# Patient Record
Sex: MALE | Race: WHITE | NOT HISPANIC OR LATINO | Employment: OTHER | URBAN - METROPOLITAN AREA
[De-identification: names, ages, dates, MRNs, and addresses within clinical notes are randomized per-mention and may not be internally consistent; named-entity substitution may affect disease eponyms.]

---

## 2018-06-11 ENCOUNTER — HOSPITAL ENCOUNTER (OUTPATIENT)
Facility: HOSPITAL | Age: 48
Discharge: HOME | End: 2018-06-13
Attending: EMERGENCY MEDICINE | Admitting: INTERNAL MEDICINE
Payer: MEDICAID

## 2018-06-11 DIAGNOSIS — I10 ESSENTIAL HYPERTENSION: ICD-10-CM

## 2018-06-11 DIAGNOSIS — K92.2 UPPER GI BLEED: Primary | ICD-10-CM

## 2018-06-11 DIAGNOSIS — I50.9 CHF (CONGESTIVE HEART FAILURE): ICD-10-CM

## 2018-06-11 DIAGNOSIS — K29.80 DUODENITIS: ICD-10-CM

## 2018-06-11 DIAGNOSIS — K92.0 HEMATEMESIS WITH NAUSEA: ICD-10-CM

## 2018-06-11 DIAGNOSIS — K29.60 NSAID INDUCED GASTRITIS: ICD-10-CM

## 2018-06-11 DIAGNOSIS — T39.395A NSAID INDUCED GASTRITIS: ICD-10-CM

## 2018-06-11 DIAGNOSIS — I50.9 CONGESTIVE HEART FAILURE, UNSPECIFIED HF CHRONICITY, UNSPECIFIED HEART FAILURE TYPE: ICD-10-CM

## 2018-06-11 DIAGNOSIS — I25.10 CORONARY ARTERY DISEASE, ANGINA PRESENCE UNSPECIFIED, UNSPECIFIED VESSEL OR LESION TYPE, UNSPECIFIED WHETHER NATIVE OR TRANSPLANTED HEART: ICD-10-CM

## 2018-06-11 DIAGNOSIS — Z95.0 CARDIAC PACEMAKER IN SITU: ICD-10-CM

## 2018-06-11 DIAGNOSIS — R10.13 EPIGASTRIC PAIN: ICD-10-CM

## 2018-06-11 DIAGNOSIS — K27.9 PEPTIC ULCER DISEASE: ICD-10-CM

## 2018-06-11 LAB
ALBUMIN SERPL BCP-MCNC: 3.4 G/DL
ALP SERPL-CCNC: 69 U/L
ALT SERPL W/O P-5'-P-CCNC: 83 U/L
ANION GAP SERPL CALC-SCNC: 12 MMOL/L
AST SERPL-CCNC: 42 U/L
BASOPHILS # BLD AUTO: 0.2 K/UL
BASOPHILS NFR BLD: 1.3 %
BILIRUB SERPL-MCNC: 1.6 MG/DL
BUN SERPL-MCNC: 19 MG/DL
CALCIUM SERPL-MCNC: 9.2 MG/DL
CHLORIDE SERPL-SCNC: 98 MMOL/L
CO2 SERPL-SCNC: 25 MMOL/L
CREAT SERPL-MCNC: 1.3 MG/DL
DIFFERENTIAL METHOD: ABNORMAL
EOSINOPHIL # BLD AUTO: 0.1 K/UL
EOSINOPHIL NFR BLD: 0.8 %
ERYTHROCYTE [DISTWIDTH] IN BLOOD BY AUTOMATED COUNT: 16.9 %
EST. GFR  (AFRICAN AMERICAN): >60 ML/MIN/1.73 M^2
EST. GFR  (NON AFRICAN AMERICAN): >60 ML/MIN/1.73 M^2
GIANT PLATELETS BLD QL SMEAR: PRESENT
GLUCOSE SERPL-MCNC: 107 MG/DL
HCT VFR BLD AUTO: 43.7 %
HGB BLD-MCNC: 14.3 G/DL
LIPASE SERPL-CCNC: 12 U/L
LYMPHOCYTES # BLD AUTO: 1.5 K/UL
LYMPHOCYTES NFR BLD: 12.6 %
MCH RBC QN AUTO: 31.3 PG
MCHC RBC AUTO-ENTMCNC: 32.6 G/DL
MCV RBC AUTO: 96 FL
MONOCYTES # BLD AUTO: 0.9 K/UL
MONOCYTES NFR BLD: 7.4 %
NEUTROPHILS # BLD AUTO: 9.6 K/UL
NEUTROPHILS NFR BLD: 77.9 %
PLATELET # BLD AUTO: 195 K/UL
PLATELET BLD QL SMEAR: ABNORMAL
PMV BLD AUTO: 10.3 FL
POTASSIUM SERPL-SCNC: 3.7 MMOL/L
PROT SERPL-MCNC: 6.9 G/DL
RBC # BLD AUTO: 4.56 M/UL
SODIUM SERPL-SCNC: 135 MMOL/L
WBC # BLD AUTO: 12.3 K/UL

## 2018-06-11 PROCEDURE — C9113 INJ PANTOPRAZOLE SODIUM, VIA: HCPCS | Performed by: INTERNAL MEDICINE

## 2018-06-11 PROCEDURE — 63600175 PHARM REV CODE 636 W HCPCS: Performed by: INTERNAL MEDICINE

## 2018-06-11 PROCEDURE — 25000003 PHARM REV CODE 250: Performed by: EMERGENCY MEDICINE

## 2018-06-11 PROCEDURE — 93010 ELECTROCARDIOGRAM REPORT: CPT | Mod: ,,, | Performed by: INTERNAL MEDICINE

## 2018-06-11 PROCEDURE — 83880 ASSAY OF NATRIURETIC PEPTIDE: CPT

## 2018-06-11 PROCEDURE — 99220 PR INITIAL OBSERVATION CARE,LEVL III: CPT | Mod: ,,, | Performed by: INTERNAL MEDICINE

## 2018-06-11 PROCEDURE — 99284 EMERGENCY DEPT VISIT MOD MDM: CPT | Mod: 25

## 2018-06-11 PROCEDURE — 83690 ASSAY OF LIPASE: CPT

## 2018-06-11 PROCEDURE — 94760 N-INVAS EAR/PLS OXIMETRY 1: CPT

## 2018-06-11 PROCEDURE — 96365 THER/PROPH/DIAG IV INF INIT: CPT

## 2018-06-11 PROCEDURE — 36415 COLL VENOUS BLD VENIPUNCTURE: CPT

## 2018-06-11 PROCEDURE — 96376 TX/PRO/DX INJ SAME DRUG ADON: CPT

## 2018-06-11 PROCEDURE — 63600175 PHARM REV CODE 636 W HCPCS: Performed by: EMERGENCY MEDICINE

## 2018-06-11 PROCEDURE — 25000003 PHARM REV CODE 250: Performed by: INTERNAL MEDICINE

## 2018-06-11 PROCEDURE — 80053 COMPREHEN METABOLIC PANEL: CPT

## 2018-06-11 PROCEDURE — C9113 INJ PANTOPRAZOLE SODIUM, VIA: HCPCS | Performed by: EMERGENCY MEDICINE

## 2018-06-11 PROCEDURE — 85018 HEMOGLOBIN: CPT

## 2018-06-11 PROCEDURE — 96366 THER/PROPH/DIAG IV INF ADDON: CPT

## 2018-06-11 PROCEDURE — 96375 TX/PRO/DX INJ NEW DRUG ADDON: CPT

## 2018-06-11 PROCEDURE — 85014 HEMATOCRIT: CPT

## 2018-06-11 PROCEDURE — 84484 ASSAY OF TROPONIN QUANT: CPT

## 2018-06-11 PROCEDURE — 85025 COMPLETE CBC W/AUTO DIFF WBC: CPT

## 2018-06-11 PROCEDURE — 93005 ELECTROCARDIOGRAM TRACING: CPT

## 2018-06-11 PROCEDURE — 96375 TX/PRO/DX INJ NEW DRUG ADDON: CPT | Mod: 59

## 2018-06-11 PROCEDURE — 96374 THER/PROPH/DIAG INJ IV PUSH: CPT

## 2018-06-11 PROCEDURE — G0378 HOSPITAL OBSERVATION PER HR: HCPCS

## 2018-06-11 PROCEDURE — 25000003 PHARM REV CODE 250: Performed by: NURSE PRACTITIONER

## 2018-06-11 RX ORDER — ACETAMINOPHEN 325 MG/1
650 TABLET ORAL EVERY 6 HOURS PRN
Status: DISCONTINUED | OUTPATIENT
Start: 2018-06-11 | End: 2018-06-13 | Stop reason: HOSPADM

## 2018-06-11 RX ORDER — MORPHINE SULFATE 8 MG/ML
8 INJECTION INTRAMUSCULAR; INTRAVENOUS; SUBCUTANEOUS
Status: COMPLETED | OUTPATIENT
Start: 2018-06-11 | End: 2018-06-11

## 2018-06-11 RX ORDER — RAMELTEON 8 MG/1
8 TABLET ORAL NIGHTLY PRN
Status: DISCONTINUED | OUTPATIENT
Start: 2018-06-11 | End: 2018-06-13 | Stop reason: HOSPADM

## 2018-06-11 RX ORDER — ONDANSETRON 4 MG/1
4 TABLET, ORALLY DISINTEGRATING ORAL
Status: COMPLETED | OUTPATIENT
Start: 2018-06-11 | End: 2018-06-11

## 2018-06-11 RX ORDER — MORPHINE SULFATE 2 MG/ML
2 INJECTION, SOLUTION INTRAMUSCULAR; INTRAVENOUS EVERY 4 HOURS PRN
Status: DISCONTINUED | OUTPATIENT
Start: 2018-06-11 | End: 2018-06-13 | Stop reason: HOSPADM

## 2018-06-11 RX ORDER — AMOXICILLIN 250 MG
1 CAPSULE ORAL 2 TIMES DAILY PRN
Status: DISCONTINUED | OUTPATIENT
Start: 2018-06-11 | End: 2018-06-13 | Stop reason: HOSPADM

## 2018-06-11 RX ORDER — FUROSEMIDE 40 MG/1
40 TABLET ORAL 2 TIMES DAILY
COMMUNITY

## 2018-06-11 RX ORDER — PANTOPRAZOLE SODIUM 40 MG/1
40 TABLET, DELAYED RELEASE ORAL DAILY
Status: DISCONTINUED | OUTPATIENT
Start: 2018-06-12 | End: 2018-06-11

## 2018-06-11 RX ORDER — MORPHINE SULFATE ORAL SOLUTION 10 MG/5ML
15 SOLUTION ORAL EVERY 4 HOURS PRN
Status: DISCONTINUED | OUTPATIENT
Start: 2018-06-11 | End: 2018-06-13 | Stop reason: HOSPADM

## 2018-06-11 RX ORDER — ONDANSETRON 4 MG/1
8 TABLET, ORALLY DISINTEGRATING ORAL EVERY 8 HOURS PRN
Status: DISCONTINUED | OUTPATIENT
Start: 2018-06-11 | End: 2018-06-13 | Stop reason: HOSPADM

## 2018-06-11 RX ORDER — IPRATROPIUM BROMIDE AND ALBUTEROL SULFATE 2.5; .5 MG/3ML; MG/3ML
3 SOLUTION RESPIRATORY (INHALATION) EVERY 4 HOURS PRN
Status: DISCONTINUED | OUTPATIENT
Start: 2018-06-11 | End: 2018-06-13 | Stop reason: HOSPADM

## 2018-06-11 RX ORDER — MAG HYDROX/ALUMINUM HYD/SIMETH 200-200-20
30 SUSPENSION, ORAL (FINAL DOSE FORM) ORAL
Status: COMPLETED | OUTPATIENT
Start: 2018-06-11 | End: 2018-06-11

## 2018-06-11 RX ORDER — PANTOPRAZOLE SODIUM 40 MG/10ML
40 INJECTION, POWDER, LYOPHILIZED, FOR SOLUTION INTRAVENOUS
Status: COMPLETED | OUTPATIENT
Start: 2018-06-11 | End: 2018-06-11

## 2018-06-11 RX ORDER — MORPHINE SULFATE 15 MG/1
15 TABLET ORAL EVERY 4 HOURS PRN
Status: DISCONTINUED | OUTPATIENT
Start: 2018-06-11 | End: 2018-06-11 | Stop reason: RX

## 2018-06-11 RX ORDER — SODIUM CHLORIDE 0.9 % (FLUSH) 0.9 %
5 SYRINGE (ML) INJECTION
Status: DISCONTINUED | OUTPATIENT
Start: 2018-06-11 | End: 2018-06-13 | Stop reason: HOSPADM

## 2018-06-11 RX ORDER — SODIUM CHLORIDE 9 MG/ML
INJECTION, SOLUTION INTRAVENOUS CONTINUOUS
Status: ACTIVE | OUTPATIENT
Start: 2018-06-12 | End: 2018-06-12

## 2018-06-11 RX ADMIN — DEXTROSE 8 MG/HR: 50 INJECTION, SOLUTION INTRAVENOUS at 08:06

## 2018-06-11 RX ADMIN — MORPHINE SULFATE 8 MG: 8 INJECTION INTRAVENOUS at 03:06

## 2018-06-11 RX ADMIN — DEXTROSE 8 MG/HR: 50 INJECTION, SOLUTION INTRAVENOUS at 11:06

## 2018-06-11 RX ADMIN — Medication 2 MG: at 11:06

## 2018-06-11 RX ADMIN — SODIUM CHLORIDE: 0.9 INJECTION, SOLUTION INTRAVENOUS at 11:06

## 2018-06-11 RX ADMIN — PANTOPRAZOLE SODIUM 40 MG: 40 INJECTION, POWDER, FOR SOLUTION INTRAVENOUS at 05:06

## 2018-06-11 RX ADMIN — MORPHINE SULFATE 15 MG: 10 SOLUTION ORAL at 08:06

## 2018-06-11 RX ADMIN — SACUBITRIL AND VALSARTAN 1 TABLET: 24; 26 TABLET, FILM COATED ORAL at 11:06

## 2018-06-11 RX ADMIN — LIDOCAINE HYDROCHLORIDE: 20 SOLUTION ORAL; TOPICAL at 11:06

## 2018-06-11 RX ADMIN — ONDANSETRON 4 MG: 4 TABLET, ORALLY DISINTEGRATING ORAL at 03:06

## 2018-06-11 RX ADMIN — Medication 2 MG: at 06:06

## 2018-06-11 RX ADMIN — ALUMINUM HYDROXIDE, MAGNESIUM HYDROXIDE, AND SIMETHICONE 30 ML: 200; 200; 20 SUSPENSION ORAL at 03:06

## 2018-06-11 NOTE — ED PROVIDER NOTES
Encounter Date: 6/11/2018    SCRIBE #1 NOTE: I, Christian Alegre, am scribing for, and in the presence of, Dr. Velasquez .       History     Chief Complaint   Patient presents with    Abdominal Pain    Hematemesis     Time seen by provider: 3:37 PM on 06/11/2018    Chief Complaint: abdominal pain     Chinedu Pineda is a 47 y.o. male with a PMHx of CAD and MI who presents to the ED for further evaluation of abdominal pain with an onset of 5 days. Patient repots that he awoke with this abdominal pain that is in the epigastric region 5 days ago. He states that he started vomiting as well during this time. Patient reports that he went to [facility] ER who prescribed the patient Protonix which is offering the patient little relief. He states that this morning he started vomiting again, reporting that towards the end of his vomiting he started to have some bright red blood in his vomit. Patient reports that he has felt like his abdomen has been more distended since this am as well. Patient states that he his lower extremity edema has worsened since this am as well. He endorses some dizziness upon standing as well. He denies any history of liver disease and drinking. Patient has a PSHx of pacer defibrillator, cholecystectomy, and appendectomy. Patient denies chest pain, SOB, fever, nausea, vomiting, diarrhea, cough, and headache.       The history is provided by the patient.     Review of patient's allergies indicates:  No Known Allergies  Past Medical History:   Diagnosis Date    Coronary artery disease     MI (myocardial infarction)      Past Surgical History:   Procedure Laterality Date    APPENDECTOMY      CHOLECYSTECTOMY      pacer defibrillator       History reviewed. No pertinent family history.  Social History   Substance Use Topics    Smoking status: Former Smoker    Smokeless tobacco: Not on file    Alcohol use Not on file     Review of Systems   Constitutional: Negative for fever.   HENT: Negative for  congestion.    Eyes: Negative for visual disturbance.   Respiratory: Negative for shortness of breath.    Cardiovascular: Negative for chest pain.   Gastrointestinal: Positive for abdominal distention, abdominal pain, nausea and vomiting. Negative for diarrhea.   Genitourinary: Negative for difficulty urinating.   Musculoskeletal: Negative for back pain and neck pain.   Skin: Negative for color change.   Neurological: Negative for headaches.   All other systems reviewed and are negative.      Physical Exam     Initial Vitals [06/11/18 1524]   BP Pulse Resp Temp SpO2   122/86 100 20 98.2 °F (36.8 °C) 100 %      MAP       --         Physical Exam    Nursing note and vitals reviewed.  Constitutional: He appears well-developed and well-nourished. He is not diaphoretic. No distress.   HENT:   Head: Normocephalic and atraumatic.   Mouth/Throat: Oropharynx is clear and moist.   Eyes: Conjunctivae are normal.   Neck: Neck supple.   Cardiovascular: Normal rate, regular rhythm, normal heart sounds and intact distal pulses. Exam reveals no gallop and no friction rub.    No murmur heard.  Pulses:       Dorsalis pedis pulses are 2+ on the right side, and 2+ on the left side.   Pulmonary/Chest: Breath sounds normal. He has no wheezes. He has no rhonchi. He has no rales.   Abdominal: Soft. He exhibits no distension. There is tenderness (voluntary guarding ) in the epigastric area. There is guarding. There is no rebound and negative Mitchell's sign.   Musculoskeletal: Normal range of motion.   Neurological: He is alert and oriented to person, place, and time.   Skin: No rash noted. No erythema.         ED Course   Procedures  Labs Reviewed   CBC W/ AUTO DIFFERENTIAL - Abnormal; Notable for the following:        Result Value    RBC 4.56 (*)     MCH 31.3 (*)     RDW 16.9 (*)     Gran # (ANC) 9.6 (*)     Gran% 77.9 (*)     Lymph% 12.6 (*)     Platelet Estimate Decreased (*)     All other components within normal limits   COMPREHENSIVE  METABOLIC PANEL - Abnormal; Notable for the following:     Sodium 135 (*)     Albumin 3.4 (*)     Total Bilirubin 1.6 (*)     AST 42 (*)     ALT 83 (*)     All other components within normal limits   LIPASE          X-Ray Chest 1 View   Final Result      Findings suggesting mild CHF         Electronically signed by: Ashley Simon MD   Date:    06/11/2018   Time:    16:07           Medical Decision Making:   History:   Old Medical Records: I decided to obtain old medical records.  Clinical Tests:   Lab Tests: Ordered and Reviewed  Medical Tests: Ordered and Reviewed            Scribe Attestation:   Scribe #1: I performed the above scribed service and the documentation accurately describes the services I performed. I attest to the accuracy of the note.    I, Dr. Jaiden Velasquez, personally performed the services described in this documentation. All medical record entries made by the scribe were at my direction and in my presence.  I have reviewed the chart and agree that the record reflects my personal performance and is accurate and complete. Jaiden Velasquez MD.  5:09 PM 06/11/2018    Chinedu Pineda is a 47 y.o. male presenting with epigastric pain in the setting of more recent hematemesis.  He reports negative CT scan for same pain several days ago at outside hospital.  Low suspicion for emergent intra-abdominal process requiring surgical intervention and I do not think repeat CT is indicated here.  I doubt perforated viscus, obstruction, atypical appendicitis, abscess.  Laboratories reviewed with mild transaminitis and elevated bilirubin to be trended.  Patient denies history of significant liver disease known in the past.  No further emesis here.  Hemoglobin is initially normal. Protonix IV given.  In view of his multiple comorbidities, I will admit for observation, serial hemoglobin assessment, and GI consultation at hospitalist service discretion.  I do not think emergent endoscopy from the emergency  department is indicated.  There is no indication for transfusion at this point.        ED Course as of Jun 11 1709   Mon Jun 11, 2018   1623 EKG:  NSR, rate of 93, wide QRS with LBBB, L axis.  Appropriate discordance.  No sign of acute ischemia.   [MR]      ED Course User Index  [MR] Jaiden Velasquez MD     Clinical Impression:   The primary encounter diagnosis was Upper GI bleed. A diagnosis of Epigastric pain was also pertinent to this visit.      Disposition:   Disposition: Admitted                        Jaiden Velasquez MD  06/11/18 5987

## 2018-06-11 NOTE — NURSING
Received pt's from ED, alert x 4. NAD noted. C/o abdominal pain, treated as order. Admission packet reviewed with them, verbalized understanding.

## 2018-06-11 NOTE — ED NOTES
Pt awake alert in no acute distress, pt reports generalized abd pain for a month, with emesis that started yesterday, denies chest pain or sob, family at bedside,

## 2018-06-12 ENCOUNTER — SURGERY (OUTPATIENT)
Age: 48
End: 2018-06-12

## 2018-06-12 ENCOUNTER — ANESTHESIA (OUTPATIENT)
Dept: ENDOSCOPY | Facility: HOSPITAL | Age: 48
End: 2018-06-12
Payer: MEDICAID

## 2018-06-12 ENCOUNTER — ANESTHESIA EVENT (OUTPATIENT)
Dept: ENDOSCOPY | Facility: HOSPITAL | Age: 48
End: 2018-06-12
Payer: MEDICAID

## 2018-06-12 PROBLEM — R50.9 FEVER: Status: ACTIVE | Noted: 2018-06-12

## 2018-06-12 PROBLEM — R11.2 NAUSEA & VOMITING: Status: ACTIVE | Noted: 2018-06-12

## 2018-06-12 PROBLEM — Z87.891 HISTORY OF SMOKING GREATER THAN 50 PACK YEARS: Status: ACTIVE | Noted: 2018-06-12

## 2018-06-12 PROBLEM — F10.10 ALCOHOL CONSUMPTION BINGE DRINKING: Status: ACTIVE | Noted: 2018-06-12

## 2018-06-12 PROBLEM — R74.01 TRANSAMINITIS: Status: ACTIVE | Noted: 2018-06-12

## 2018-06-12 PROBLEM — T39.395A NSAID INDUCED GASTRITIS: Status: ACTIVE | Noted: 2018-06-12

## 2018-06-12 PROBLEM — I50.9 CHF (CONGESTIVE HEART FAILURE): Status: ACTIVE | Noted: 2018-06-12

## 2018-06-12 PROBLEM — K29.60 NSAID INDUCED GASTRITIS: Status: ACTIVE | Noted: 2018-06-12

## 2018-06-12 LAB
ALBUMIN SERPL BCP-MCNC: 2.9 G/DL
ALP SERPL-CCNC: 57 U/L
ALT SERPL W/O P-5'-P-CCNC: 61 U/L
ANION GAP SERPL CALC-SCNC: 9 MMOL/L
AORTIC ROOT ANNULUS: 3.26 CM
AORTIC VALVE CUSP SEPERATION: 2.14 CM
AST SERPL-CCNC: 27 U/L
AV MEAN GRADIENT: 3.82 MMHG
AV PEAK GRADIENT: 6.44 MMHG
AV VALVE AREA: 2.44 CM2
BASOPHILS # BLD AUTO: 0.1 K/UL
BASOPHILS NFR BLD: 0.5 %
BILIRUB SERPL-MCNC: 1.4 MG/DL
BNP SERPL-MCNC: 1500 PG/ML
BSA FOR ECHO PROCEDURE: 2.15 M2
BUN SERPL-MCNC: 17 MG/DL
CALCIUM SERPL-MCNC: 8.6 MG/DL
CHLORIDE SERPL-SCNC: 97 MMOL/L
CHOLEST SERPL-MCNC: 82 MG/DL
CHOLEST/HDLC SERPL: 4.1 {RATIO}
CO2 SERPL-SCNC: 26 MMOL/L
CREAT SERPL-MCNC: 1.2 MG/DL
CV ECHO LV RWT: 0.26 CM
DIFFERENTIAL METHOD: ABNORMAL
DOP CALC AO PEAK VEL: 1.27 M/S
DOP CALC AO VTI: 17.76 CM
DOP CALC LVOT AREA: 3.2 CM2
DOP CALC LVOT DIAMETER: 2.02 CM
DOP CALC LVOT STROKE VOLUME: 43.4 CM3
DOP CALCLVOT PEAK VEL VTI: 13.55 CM
E WAVE DECELERATION TIME: 85.78 MSEC
E/A RATIO: 2.04
E/E' RATIO: 9.27
ECHO LV POSTERIOR WALL: 0.81 CM (ref 0.6–1.1)
EOSINOPHIL # BLD AUTO: 0.1 K/UL
EOSINOPHIL NFR BLD: 1 %
ERYTHROCYTE [DISTWIDTH] IN BLOOD BY AUTOMATED COUNT: 16.3 %
EST. GFR  (AFRICAN AMERICAN): >60 ML/MIN/1.73 M^2
EST. GFR  (NON AFRICAN AMERICAN): >60 ML/MIN/1.73 M^2
FRACTIONAL SHORTENING: 7 % (ref 28–44)
GLUCOSE SERPL-MCNC: 111 MG/DL
HCT VFR BLD AUTO: 39.4 %
HCT VFR BLD AUTO: 39.4 %
HCT VFR BLD AUTO: 40 %
HCT VFR BLD AUTO: 40.4 %
HCT VFR BLD AUTO: 40.6 %
HDLC SERPL-MCNC: 20 MG/DL
HDLC SERPL: 24.4 %
HGB BLD-MCNC: 12.8 G/DL
HGB BLD-MCNC: 12.8 G/DL
HGB BLD-MCNC: 13.2 G/DL
HGB BLD-MCNC: 13.3 G/DL
HGB BLD-MCNC: 13.3 G/DL
INTERVENTRICULAR SEPTUM: 0.92 CM (ref 0.6–1.1)
IVRT: 0.05 MSEC
LDLC SERPL CALC-MCNC: 50 MG/DL
LEFT ATRIUM SIZE: 4.08 CM
LEFT INTERNAL DIMENSION IN SYSTOLE: 6.15 CM (ref 2.1–4)
LEFT VENTRICLE MASS INDEX: 113 G/M2
LEFT VENTRICULAR INTERNAL DIMENSION IN DIASTOLE: 6.61 CM (ref 3.5–6)
LEFT VENTRICULAR MASS: 242.95 G
LV LATERAL E/E' RATIO: 6.32
LV SEPTAL E/E' RATIO: 17.38
LYMPHOCYTES # BLD AUTO: 2.2 K/UL
LYMPHOCYTES NFR BLD: 18 %
MAGNESIUM SERPL-MCNC: 2 MG/DL
MCH RBC QN AUTO: 30.9 PG
MCHC RBC AUTO-ENTMCNC: 32.6 G/DL
MCV RBC AUTO: 95 FL
MONOCYTES # BLD AUTO: 1.4 K/UL
MONOCYTES NFR BLD: 11.2 %
MV PEAK A VEL: 0.68 M/S
MV PEAK E VEL: 1.39 M/S
MV STENOSIS PRESSURE HALF TIME: 24.88 MS
MV VALVE AREA P 1/2 METHOD: 8.84 CM2
NEUTROPHILS # BLD AUTO: 8.3 K/UL
NEUTROPHILS NFR BLD: 69.3 %
NONHDLC SERPL-MCNC: 62 MG/DL
PHOSPHATE SERPL-MCNC: 2.9 MG/DL
PISA TR MAX VEL: 2.6 M/S
PLATELET # BLD AUTO: 163 K/UL
PMV BLD AUTO: 9.7 FL
POTASSIUM SERPL-SCNC: 4.2 MMOL/L
PROT SERPL-MCNC: 5.9 G/DL
PV PEAK GRADIENT: 3.78 MMHG
PV PEAK VELOCITY: 0.97 CM/S
RA PRESSURE: 3 MMHG
RBC # BLD AUTO: 4.15 M/UL
RIGHT VENTRICULAR END-DIASTOLIC DIMENSION: 4.84 CM
SODIUM SERPL-SCNC: 132 MMOL/L
TDI LATERAL: 0.22
TDI SEPTAL: 0.08
TDI: 0.15
TR MAX PG: 27.04 MMHG
TRIGL SERPL-MCNC: 60 MG/DL
TROPONIN I SERPL DL<=0.01 NG/ML-MCNC: 0.01 NG/ML
TROPONIN I SERPL DL<=0.01 NG/ML-MCNC: 0.03 NG/ML
TSH SERPL DL<=0.005 MIU/L-ACNC: 1.28 UIU/ML
TV REST PULMONARY ARTERY PRESSURE: 30.04 MMHG
WBC # BLD AUTO: 12 K/UL

## 2018-06-12 PROCEDURE — D9220A PRA ANESTHESIA: Mod: ANES,,, | Performed by: ANESTHESIOLOGY

## 2018-06-12 PROCEDURE — 63600175 PHARM REV CODE 636 W HCPCS: Performed by: INTERNAL MEDICINE

## 2018-06-12 PROCEDURE — 96375 TX/PRO/DX INJ NEW DRUG ADDON: CPT

## 2018-06-12 PROCEDURE — 99900035 HC TECH TIME PER 15 MIN (STAT)

## 2018-06-12 PROCEDURE — G0378 HOSPITAL OBSERVATION PER HR: HCPCS

## 2018-06-12 PROCEDURE — 25000003 PHARM REV CODE 250: Performed by: INTERNAL MEDICINE

## 2018-06-12 PROCEDURE — 99226 PR SUBSEQUENT OBSERVATION CARE,LEVEL III: CPT | Mod: ,,, | Performed by: INTERNAL MEDICINE

## 2018-06-12 PROCEDURE — 25000003 PHARM REV CODE 250: Performed by: NURSE PRACTITIONER

## 2018-06-12 PROCEDURE — 25000003 PHARM REV CODE 250: Performed by: EMERGENCY MEDICINE

## 2018-06-12 PROCEDURE — 84100 ASSAY OF PHOSPHORUS: CPT

## 2018-06-12 PROCEDURE — 99205 OFFICE O/P NEW HI 60 MIN: CPT | Mod: 25,,, | Performed by: INTERNAL MEDICINE

## 2018-06-12 PROCEDURE — 83735 ASSAY OF MAGNESIUM: CPT

## 2018-06-12 PROCEDURE — 87040 BLOOD CULTURE FOR BACTERIA: CPT

## 2018-06-12 PROCEDURE — 36415 COLL VENOUS BLD VENIPUNCTURE: CPT

## 2018-06-12 PROCEDURE — 88305 TISSUE EXAM BY PATHOLOGIST: CPT | Mod: 26,,, | Performed by: PATHOLOGY

## 2018-06-12 PROCEDURE — 84443 ASSAY THYROID STIM HORMONE: CPT

## 2018-06-12 PROCEDURE — 43239 EGD BIOPSY SINGLE/MULTIPLE: CPT | Mod: ,,, | Performed by: INTERNAL MEDICINE

## 2018-06-12 PROCEDURE — 84484 ASSAY OF TROPONIN QUANT: CPT | Mod: 91

## 2018-06-12 PROCEDURE — 96376 TX/PRO/DX INJ SAME DRUG ADON: CPT

## 2018-06-12 PROCEDURE — 85018 HEMOGLOBIN: CPT | Mod: 91

## 2018-06-12 PROCEDURE — 85014 HEMATOCRIT: CPT

## 2018-06-12 PROCEDURE — 96366 THER/PROPH/DIAG IV INF ADDON: CPT

## 2018-06-12 PROCEDURE — 37000009 HC ANESTHESIA EA ADD 15 MINS: Performed by: INTERNAL MEDICINE

## 2018-06-12 PROCEDURE — 96361 HYDRATE IV INFUSION ADD-ON: CPT

## 2018-06-12 PROCEDURE — 80061 LIPID PANEL: CPT

## 2018-06-12 PROCEDURE — 27201012 HC FORCEPS, HOT/COLD, DISP: Performed by: INTERNAL MEDICINE

## 2018-06-12 PROCEDURE — 96367 TX/PROPH/DG ADDL SEQ IV INF: CPT

## 2018-06-12 PROCEDURE — 85025 COMPLETE CBC W/AUTO DIFF WBC: CPT

## 2018-06-12 PROCEDURE — 94760 N-INVAS EAR/PLS OXIMETRY 1: CPT

## 2018-06-12 PROCEDURE — C9113 INJ PANTOPRAZOLE SODIUM, VIA: HCPCS | Performed by: INTERNAL MEDICINE

## 2018-06-12 PROCEDURE — D9220A PRA ANESTHESIA: Mod: CRNA,,, | Performed by: NURSE ANESTHETIST, CERTIFIED REGISTERED

## 2018-06-12 PROCEDURE — 37000008 HC ANESTHESIA 1ST 15 MINUTES: Performed by: INTERNAL MEDICINE

## 2018-06-12 PROCEDURE — 43239 EGD BIOPSY SINGLE/MULTIPLE: CPT | Performed by: INTERNAL MEDICINE

## 2018-06-12 PROCEDURE — 80053 COMPREHEN METABOLIC PANEL: CPT

## 2018-06-12 PROCEDURE — 88305 TISSUE EXAM BY PATHOLOGIST: CPT | Performed by: PATHOLOGY

## 2018-06-12 PROCEDURE — 63600175 PHARM REV CODE 636 W HCPCS: Performed by: NURSE ANESTHETIST, CERTIFIED REGISTERED

## 2018-06-12 RX ORDER — EPINEPHRINE 0.1 MG/ML
INJECTION INTRAVENOUS
Status: DISCONTINUED
Start: 2018-06-12 | End: 2018-06-12 | Stop reason: WASHOUT

## 2018-06-12 RX ORDER — PROPOFOL 10 MG/ML
INJECTION, EMULSION INTRAVENOUS
Status: COMPLETED
Start: 2018-06-12 | End: 2018-06-12

## 2018-06-12 RX ORDER — SUCRALFATE 1 G/10ML
1 SUSPENSION ORAL EVERY 6 HOURS
Status: DISCONTINUED | OUTPATIENT
Start: 2018-06-12 | End: 2018-06-13 | Stop reason: HOSPADM

## 2018-06-12 RX ORDER — LIDOCAINE HCL/PF 100 MG/5ML
SYRINGE (ML) INTRAVENOUS
Status: DISCONTINUED | OUTPATIENT
Start: 2018-06-12 | End: 2018-06-12

## 2018-06-12 RX ORDER — PROPOFOL 10 MG/ML
VIAL (ML) INTRAVENOUS
Status: DISCONTINUED | OUTPATIENT
Start: 2018-06-12 | End: 2018-06-12

## 2018-06-12 RX ORDER — LIDOCAINE HYDROCHLORIDE 20 MG/ML
INJECTION, SOLUTION EPIDURAL; INFILTRATION; INTRACAUDAL; PERINEURAL
Status: DISCONTINUED
Start: 2018-06-12 | End: 2018-06-13 | Stop reason: HOSPADM

## 2018-06-12 RX ORDER — ROSUVASTATIN CALCIUM 20 MG/1
20 TABLET, COATED ORAL NIGHTLY
Status: DISCONTINUED | OUTPATIENT
Start: 2018-06-12 | End: 2018-06-13 | Stop reason: HOSPADM

## 2018-06-12 RX ADMIN — MORPHINE SULFATE 15 MG: 10 SOLUTION ORAL at 01:06

## 2018-06-12 RX ADMIN — Medication 2 MG: at 09:06

## 2018-06-12 RX ADMIN — ONDANSETRON 8 MG: 4 TABLET, ORALLY DISINTEGRATING ORAL at 11:06

## 2018-06-12 RX ADMIN — RAMELTEON 8 MG: 8 TABLET, FILM COATED ORAL at 11:06

## 2018-06-12 RX ADMIN — DEXTROSE 8 MG/HR: 50 INJECTION, SOLUTION INTRAVENOUS at 08:06

## 2018-06-12 RX ADMIN — PROPOFOL 20 MG: 10 INJECTION, EMULSION INTRAVENOUS at 07:06

## 2018-06-12 RX ADMIN — MORPHINE SULFATE 15 MG: 10 SOLUTION ORAL at 03:06

## 2018-06-12 RX ADMIN — ROSUVASTATIN CALCIUM 20 MG: 20 TABLET, FILM COATED ORAL at 08:06

## 2018-06-12 RX ADMIN — Medication 2 MG: at 05:06

## 2018-06-12 RX ADMIN — LIDOCAINE HYDROCHLORIDE 100 MG: 20 INJECTION, SOLUTION INTRAVENOUS at 07:06

## 2018-06-12 RX ADMIN — MORPHINE SULFATE 15 MG: 10 SOLUTION ORAL at 11:06

## 2018-06-12 RX ADMIN — SACUBITRIL AND VALSARTAN 1 TABLET: 24; 26 TABLET, FILM COATED ORAL at 08:06

## 2018-06-12 RX ADMIN — Medication 2 MG: at 08:06

## 2018-06-12 RX ADMIN — PROPOFOL 50 MG: 10 INJECTION, EMULSION INTRAVENOUS at 07:06

## 2018-06-12 RX ADMIN — DEXTROSE 8 MG/HR: 50 INJECTION, SOLUTION INTRAVENOUS at 11:06

## 2018-06-12 RX ADMIN — Medication 2 MG: at 01:06

## 2018-06-12 RX ADMIN — SACUBITRIL AND VALSARTAN 1 TABLET: 24; 26 TABLET, FILM COATED ORAL at 03:06

## 2018-06-12 RX ADMIN — ONDANSETRON 8 MG: 4 TABLET, ORALLY DISINTEGRATING ORAL at 04:06

## 2018-06-12 RX ADMIN — SUCRALFATE 1 G: 1 SUSPENSION ORAL at 05:06

## 2018-06-12 RX ADMIN — Medication 2 MG: at 03:06

## 2018-06-12 RX ADMIN — MORPHINE SULFATE 15 MG: 10 SOLUTION ORAL at 07:06

## 2018-06-12 RX ADMIN — DEXTROSE 8 MG/HR: 50 INJECTION, SOLUTION INTRAVENOUS at 02:06

## 2018-06-12 RX ADMIN — SUCRALFATE 1 G: 1 SUSPENSION ORAL at 11:06

## 2018-06-12 RX ADMIN — PROPOFOL 100 MG: 10 INJECTION, EMULSION INTRAVENOUS at 07:06

## 2018-06-12 RX ADMIN — ACETAMINOPHEN 650 MG: 325 TABLET, FILM COATED ORAL at 05:06

## 2018-06-12 RX ADMIN — SACUBITRIL AND VALSARTAN 1 TABLET: 49; 51 TABLET, FILM COATED ORAL at 08:06

## 2018-06-12 NOTE — PROGRESS NOTES
EGD done.  Esophagus and proximal stomach normal.  Distal gastritis noted.  Severe duodenitis with ulceration and adherent blood noted.  Recommend BID PPI.  Recommend sucralfate x 10 days.  Avoid NSAIDs and transfuse PRN.  Needs repeat EGD in 12 weeks to check healing.  Consider cardiology consult given his history.  Advance diet as tolerated.

## 2018-06-12 NOTE — TRANSFER OF CARE
"Anesthesia Transfer of Care Note    Patient: Chinedu Pineda    Procedure(s) Performed: Procedure(s) (LRB):  EGD (ESOPHAGOGASTRODUODENOSCOPY) (N/A)    Patient location: PACU    Anesthesia Type: general    Transport from OR: Transported from OR on room air with adequate spontaneous ventilation    Post pain: adequate analgesia    Post assessment: no apparent anesthetic complications    Post vital signs: stable    Level of consciousness: awake    Nausea/Vomiting: no nausea/vomiting    Complications: none    Transfer of care protocol was followed      Last vitals:   Visit Vitals  BP 99/65 (BP Location: Left arm, Patient Position: Lying)   Pulse 78   Temp 37.1 °C (98.7 °F) (Oral)   Resp (!) 22   Ht 6' 1" (1.854 m)   Wt 89.4 kg (197 lb)   SpO2 97%   BMI 25.99 kg/m²     "

## 2018-06-12 NOTE — ASSESSMENT & PLAN NOTE
Nausea & vomiting  - presents with 5 days of ABD pain with associative N/V  - endorses BRB noted with emesis today   - GI consulted   - clears for now and NPO after MN  - gentle hydration while NPO   - continue pantoprazole infusion   - PRN antiemetics and pain management   - GI cocktail x 1  - Q 6 h/h for now

## 2018-06-12 NOTE — PLAN OF CARE
06/12/18 1124   PRE-TX-O2-ETCO2   O2 Device (Oxygen Therapy) room air   SpO2 96 %   Pulse Oximetry Type Intermittent   $ Pulse Oximetry - Single Charge Pulse Oximetry - Single

## 2018-06-12 NOTE — ANESTHESIA POSTPROCEDURE EVALUATION
"Anesthesia Post Evaluation    Patient: Chinedu Pineda    Procedure(s) Performed: Procedure(s) (LRB):  EGD (ESOPHAGOGASTRODUODENOSCOPY) (N/A)    Final Anesthesia Type: general  Patient location during evaluation: PACU  Patient participation: Yes- Able to Participate  Level of consciousness: awake and alert  Post-procedure vital signs: reviewed and stable  Pain management: adequate  Airway patency: patent  PONV status at discharge: No PONV  Anesthetic complications: no      Cardiovascular status: hemodynamically stable  Respiratory status: unassisted and room air  Hydration status: euvolemic  Follow-up not needed.        Visit Vitals  /71   Pulse 75   Temp 37.1 °C (98.7 °F) (Oral)   Resp (!) 21   Ht 6' 1" (1.854 m)   Wt 89.4 kg (197 lb)   SpO2 95%   BMI 25.99 kg/m²       Pain/Rashaun Score: Pain Assessment Performed: Yes (6/12/2018  9:00 AM)  Presence of Pain: complains of pain/discomfort (6/12/2018  9:00 AM)  Pain Rating Prior to Med Admin: 10 (6/12/2018  8:43 AM)  Pain Rating Post Med Admin: 6 (6/12/2018  6:08 AM)  Rashaun Score: 10 (6/12/2018  8:25 AM)      "

## 2018-06-12 NOTE — OR NURSING
Report called to Kim, floor nurse.  No distress noted except c/o abd pain. meds for pain to be given on floor. Transfer back to room 203 via wheelchair.

## 2018-06-12 NOTE — PROGRESS NOTES
Progress Note  Hospital Medicine  Patient Name:Chinedu Pineda  MRN:  [id number]  Patient Class: OP- Observation  Admit Date: 6/11/2018  Length of Stay: 0 days  Expected Discharge Date:   Attending Physician: Trevor Koroma MD  Primary Care Provider:  Diomedes Vega MD    SUBJECTIVE:     Principal Problem: Upper GI bleed  Initial history of present illness: 46 y/o gentleman, who presents to the ED with c/o ABD pain and N/V.  He has a PMH of HTN, CAD, prior MI, and CHF with an estimated EF reportedly of 15%.  He reports that symptoms started 5 days ago and have been persistent since onset.  He states that he was evaluated at [facility] ED and a CT was performed which was reportedly negative for acute findings.  He was discharged home with PO pantoprazole.  He states that discomfort is severe and in the epigastric regimen.  He reports minimal changes in symptoms since that time and endorses seeing bright red blood in his vomit today.  At this time, he c/o ongoing abd discomfort despite morphine administration.   He endorses worsening edema (hands, ABD, and BLE).  He states that he has not been able to take any of his home medications including his furosemide d/t the ongoing vomiting. He denies fever, chills, CP, SOB, diarrhea, or  symptoms.   He is s/p pacer / AICD implantation (2013) which was placed while in [place].     PMH/PSH/SH/FH/Meds: reviewed.    Symptoms/Review of Systems: Patient is reporting epigastric abdominal pain. Just had EGD done. Reports [facility]. Patient follows with cardiologist at [facility]. Blood cultures obtaine dfor fever 101F last night. No shortness of breath, cough, chest pain or headache, fever or abdominal pain. Patient admits using Motrin and Aleve on regular basis.  Diet:  [place] diet   Activity level: Normal.    Pain:  As above     OBJECTIVE:   Vital Signs (Most Recent):      Temp: 98.7 °F (37.1 °C) (06/12/18 0711)  Pulse: 75 (06/12/18 0825)  Resp: (!) 21 (06/12/18  0825)  BP: 102/71 (06/12/18 0825)  SpO2: 95 % (06/12/18 0825)       Vital Signs Range (Last 24H):  Temp:  [98.2 °F (36.8 °C)-101 °F (38.3 °C)]   Pulse:  []   Resp:  [16-22]   BP: ()/(56-88)   SpO2:  [94 %-100 %]     Weight: 89.4 kg (197 lb)  Body mass index is 25.99 kg/m².    Intake/Output Summary (Last 24 hours) at 06/12/18 0858  Last data filed at 06/12/18 0755   Gross per 24 hour   Intake              850 ml   Output                0 ml   Net              850 ml     Physical Examination:  Constitutional: He is oriented to person, place, and time. He appears well-developed and well-nourished. He appears ill. No distress.   Appears uncomfortable    HENT:   Head: Normocephalic and atraumatic.   Eyes: EOM are normal. Pupils are equal, round, and reactive to light.   Neck: Normal range of motion. Neck supple.   Cardiovascular: Normal rate, regular rhythm, normal heart sounds and intact distal pulses.    No murmur heard.  pacermaker / AICD L upper chest   Pulmonary/Chest: Effort normal. No respiratory distress. He has no wheezes. He has rales.   Abdominal: Soft. Bowel sounds are normal. There is tenderness in epigastrium.  Musculoskeletal: Normal range of motion. He exhibits edema.   Neurological: He is alert and oriented to person, place, and time.   Skin: Skin is warm and dry. He is not diaphoretic.   Psychiatric: He has a normal mood and affect. His behavior is normal.   Nursing note and vitals reviewed.  CRANIAL NERVES   CN III, IV, VI   Pupils are equal, round, and reactive to light.  Extraocular motions are normal.     CBC:    Recent Labs  Lab 06/11/18  1540 06/11/18  2356 06/12/18  0421   WBC 12.30  --  12.00   RBC 4.56*  --  4.15*   HGB 14.3 13.3* 12.8*  12.8*   HCT 43.7 40.6 39.4*  39.4*     --  163   MCV 96  --  95   MCH 31.3*  --  30.9   MCHC 32.6  --  32.6   BMP    Recent Labs  Lab 06/11/18  1540 06/12/18  0421    111*   * 132*   K 3.7 4.2   CL 98 97   CO2 25 26   BUN 19 17    CREATININE 1.3 1.2   CALCIUM 9.2 8.6*   MG  --  2.0      Diagnostic Results:  Microbiology Results (last 7 days)     Procedure Component Value Units Date/Time    Blood culture [[id number]] Collected:  06/12/18 0530    Order Status:  Sent Specimen:  Blood Updated:  06/12/18 0534    Blood culture [[id number]] Collected:  06/12/18 0534    Order Status:  Sent Specimen:  Blood Updated:  06/12/18 0534         CXR: Findings suggesting mild CHF.    EGD:   - Normal esophagus.                       - Z-line regular, 40 cm from the incisors.                       - Small hiatal hernia.                       - Gastritis. Biopsied.                       - Duodenitis. Biopsied.                       - Normal second portion of the duodenum.    Assessment/Plan:     * Upper GI bleed     Nausea & vomiting  Severe acute duodenitis with ulceration and acute gastritis  - Appreciate Dr. Allen's recommendations.  - Continue PPI and carate and repeat EGD in 3 months. Patient counseled to avoid use of NSAIDs.  - Monitor H/H.           CHF (congestive heart failure)     Cardiac pacemaker in situ  - S/P AICD / pacer placement 2013  - reports EF estimated at 15%  - DC IVF.  - monitor fluid status closely - Strict I/o's - Daily Wt's  - tele  - repeat ECHO pendin. Dr. Crane to evaluate the patient.  - BNP > 1500          CAD (coronary artery disease)     - hx of prior MI   - denies CP / SOB at this time  - EKG without obvious acute ischemic changes   - maintain on telemetry  - trend electrolytes daily and replete as indicated           Essential hypertension     - SBP ranging between 120-125  - continue sacubitril / valsartan   - hold furosemide for now           Transaminitis     - mildly elevated with initial labs   - likely r/t impaired cardiac function  - Trend LFTs.       VTE Risk Mitigation         Ordered     Place sequential compression device  Until discontinued      06/11/18 2103     Place VIDAL hose  Until discontinued      06/11/18  2103     IP VTE LOW RISK PATIENT  Once      06/11/18 1754        Trevor Koroma MD  Department of Hospital Medicine   [facility]

## 2018-06-12 NOTE — HPI
46 y/o gentleman, who presents to the ED with c/o ABD pain and N/V.  He has a PMH of HTN, CAD, prior MI, and CHF with an estimated EF reportedly of 15%.  He reports that symptoms started 5 days ago and have been persistent since onset.  He states that he was evaluated at [facility] ED and a CT was performed which was reportedly negative for acute findings.  He was discharged home with PO pantoprazole.  He states that discomfort is severe and in the epigastric regimen.  He reports minimal changes in symptoms since that time and endorses seeing bright red blood in his vomit today.  At this time, he c/o ongoing abd discomfort despite morphine administration.   He endorses worsening edema (hands, ABD, and BLE).  He states that he has not been able to take any of his home medications including his furosemide d/t the ongoing vomiting. He denies fever, chills, CP, SOB, diarrhea, or  symptoms.   He is s/p pacer / AICD implantation (2013) which was placed while in [place].

## 2018-06-12 NOTE — ASSESSMENT & PLAN NOTE
- hx of prior MI   - denies CP / SOB at this time  - EKG without obvious acute ischemic changes   - maintain on telemetry  - trend electrolytes daily and replete as indicated

## 2018-06-12 NOTE — CONSULTS
[facility]  Cardiology  Consult Note    Patient Name: Chinedu Pineda  MRN: [id number]  Admission Date: 6/11/2018  Hospital Length of Stay: 0 days  Code Status: Full Code   Attending Provider: Dr. Koroma  Consulting Provider: Chinedu Crane MD  Primary Care Physician: Diomedes Vega MD  Principal Problem:Upper GI bleed    Patient information was obtained from patient, spouse/SO and ER records.   Patient is a new patient to me.  PCP: Dr. Dozier, in [place], [place]  Cardiologist: Dr. Vega, [facility], appointment on 6/18  Lives with wife, Santa, non-smoker  Disabled since MI in 2013, applying for it, prior [employer] mobile Home       Consults  Subjective:     Chief Complaint:  Acute epigastric pain with hemoptysis, HFrEF     HPI: WM with history of AMI complicated by now chronic HF, have AICD in place. Also report problem with pericarditis about 2 months ago and started on low dose Entresto as IP and have remained on it without the usual titration. Here with problem during a trip home from main campus [facility]. Wife see a physician there. Report gastric pains over the last 2 weeks, reviewed at ED and recommended GI review. Been on daily NSAID use of Motrin and Aleve also the last 2 weeks for his CLBP. Prior smoker, quit 5/2017, 60+ pack-years. Admit to binge drinking on every other weekends.    Cardiac history shows CHF since MI. Report orthopnea for the past 2 months. Noted intermittent peripheral edema and would double up on Lasix as needed. ECG not available at this time. Said to be compliant with medications, but home medications only listed Entresto and Lasix.    Past Medical History:   Diagnosis Date    Coronary artery disease     Hypertension     MI (myocardial infarction)        Past Surgical History:   Procedure Laterality Date    APPENDECTOMY      CHOLECYSTECTOMY      pacer defibrillator         Review of patient's allergies indicates:  No Known Allergies    No current  facility-administered medications on file prior to encounter.      No current outpatient prescriptions on file prior to encounter.     Family History     Problem Relation (Age of Onset)    Cancer Sister    Diabetes Brother    Heart disease Father    Hypertension Mother, Father        Social History Main Topics    Smoking status: Former Smoker    Smokeless tobacco: Not on file    Alcohol use Yes      Comment:  once and awhile    Drug use: No    Sexual activity: Yes     ROS   Constitutional: negative for chills, fatigue, have fever, no sweats  Eyes: negative for icterus, redness and visual disturbance  Respiratory: negative for asthma, cough, positive for dyspnea on exertion, hemoptysis, no pleurisy/chest pain and wheezing  Cardiovascular: negative for chest pain, chest pressure/discomfort, have chronic dyspnea, no near-syncope, palpitations, report paroxysmal nocturnal dyspnea with peripheral edema and no syncope  Gastrointestinal: positive for abdominal pain, nausea and vomiting  Musculoskeletal:negative for arthralgias, muscle weakness and myalgias  Neurological: negative for coordination problems, dizziness, gait problems, headaches, paresthesia, tremors and vertigo  Psych: no depression    Objective:     Vital Signs (Most Recent):  Temp: 98.2 °F (36.8 °C) (06/12/18 1131)  Pulse: 83 (06/12/18 1310)  Resp: 18 (06/12/18 1131)  BP: 90/61 (06/12/18 1310)  SpO2: 99 % (06/12/18 1131) Vital Signs (24h Range):  Temp:  [98.2 °F (36.8 °C)-101 °F (38.3 °C)] 98.2 °F (36.8 °C)  Pulse:  [] 83  Resp:  [16-22] 18  SpO2:  [94 %-100 %] 99 %  BP: ()/(56-88) 90/61     Weight: 89.4 kg (197 lb)  Body mass index is 25.99 kg/m².    SpO2: 99 %  O2 Device (Oxygen Therapy): room air      Intake/Output Summary (Last 24 hours) at 06/12/18 1356  Last data filed at 06/12/18 0755   Gross per 24 hour   Intake              850 ml   Output                0 ml   Net              850 ml       Lines/Drains/Airways     Peripheral  Intravenous Line                 Peripheral IV - Single Lumen 06/11/18 1627 Right Antecubital less than 1 day                Physical Exam  General appearance: alert, appears stated age, cooperative and no distress  Head: Normocephalic, without obvious abnormality, atraumatic  Eyes:  conjunctivae/corneas clear. PERRL, EOM's intact.  Neck: no adenopathy, no carotid bruit, no JVD, supple, symmetrical, trachea midline and thyroid not enlarged, symmetric, no tenderness/mass/nodules  Lungs:  clear to auscultation bilaterally  Chest wall: no tenderness  Heart: regular rate and rhythm, S1, S2 normal, soft murmur with S4 gallop, click, rub or gallop and normal apical impulse  Abdomen: soft, non-tender; bowel sounds normal; no masses,  no organomegaly  Extremities: extremities normal, atraumatic, no cyanosis or edema  Pulses: 2+ and symmetric    Significant Labs:   ABG: No results for input(s): PH, PCO2, HCO3, POCSATURATED, BE in the last 48 hours., CMP     Recent Labs  Lab 06/11/18  1540 06/12/18  0421   * 132*   K 3.7 4.2   CL 98 97   CO2 25 26    111*   BUN 19 17   CREATININE 1.3 1.2   CALCIUM 9.2 8.6*   PROT 6.9 5.9*   ALBUMIN 3.4* 2.9*   BILITOT 1.6* 1.4*   ALKPHOS 69 57   AST 42* 27   ALT 83* 61*   ANIONGAP 12 9   ESTGFRAFRICA >60 >60   EGFRNONAA >60 >60   , CBC     Recent Labs  Lab 06/11/18  1540 06/11/18  2356 06/12/18  0421 06/12/18  1127   WBC 12.30  --  12.00  --    HGB 14.3 13.3* 12.8*  12.8* 13.2*   HCT 43.7 40.6 39.4*  39.4* 40.0     --  163  --    , INR No results for input(s): INR, PROTIME in the last 48 hours., Lipid Panel No results for input(s): CHOL, HDL, LDLCALC, TRIG, CHOLHDL in the last 48 hours., Troponin     Recent Labs  Lab 06/11/18  2356 06/12/18  0421 06/12/18  1127   TROPONINI 0.025 0.015 0.013    and All pertinent lab results from the last 24 hours have been reviewed.    Significant Imaging: X-Ray: CXR: X-Ray Chest 1 View (CXR):   Results for orders placed or performed  during the hospital encounter of 06/11/18   X-Ray Chest 1 View    Narrative    EXAMINATION:  XR CHEST 1 VIEW    CLINICAL HISTORY:  Upper abdominal pain, leg swelling;    TECHNIQUE:  Single frontal view of the chest was performed.    COMPARISON:  None    FINDINGS:  Heart appears upper limits of normal in size.  There are cardiac pacing leads present.  There is haziness perihilar infrahilar suggesting mild infiltrate and mild CHF.  No pleural effusion      Impression    Findings suggesting mild CHF      Electronically signed by: Ashley Simon MD  Date:    06/11/2018  Time:    16:07     Assessment and Plan:     Active Diagnoses:    Diagnosis Date Noted POA    PRINCIPAL PROBLEM:  Upper GI bleed [K92.2] 06/11/2018 Yes    CHF (congestive heart failure) [I50.9] 06/12/2018 Yes    Nausea & vomiting [R11.2] 06/12/2018 Yes    Transaminitis [R74.0] 06/12/2018 Yes    Fever [R50.9] 06/12/2018 Yes    History of smoking greater than 50 pack years, quit 5/2017, 60+ pack years [Z87.891] 06/12/2018 Not Applicable    Alcohol consumption binge drinking [F10.10] 06/12/2018 Yes    NSAID induced gastritis [K29.60, T39.395A] 06/12/2018 Yes    CAD (coronary artery disease) [I25.10] 06/11/2018 Yes    Cardiac pacemaker in situ [Z95.0] 06/11/2018 Yes    Essential hypertension [I10] 06/11/2018 Yes      Problems Resolved During this Admission:    Diagnosis Date Noted Date Resolved POA       VTE Risk Mitigation         Ordered     Place sequential compression device  Until discontinued      06/11/18 2103     Place VIDAL hose  Until discontinued      06/11/18 2103     IP VTE LOW RISK PATIENT  Once      06/11/18 1754        With ischemic cardiomyopathy, on only Lasix and low dose Entresto  Hypotension  Post MI not on GDMT (statin, antiplatelet, BB if tolerated, diet and regular exercise)  HFrEF exacerbated by alcohol addiction  GIB with duodenal ulcer and gastritis due to use of NSAID  All medical issues reviewed, continue current  Rx.    Will double current dose of Entresto and start GDMT.  Thank you for your consult. I will follow-up with patient. Please contact us if you have any additional questions.    Chinedu Crane MD  Cardiology   [facility]    Total face-to-face time with the patient was 40 minutes and greater than 50% was spent in counseling and coordination of care. The above assessment and plan have been discussed at length. Referring physician's note reviewed. Labs and procedure over the last 6 months reviewed. Problem List updated.

## 2018-06-12 NOTE — PLAN OF CARE
Problem: Patient Care Overview  Goal: Plan of Care Review  Outcome: Ongoing (interventions implemented as appropriate)  Egd, morphine, npo   06/12/18 0132   Coping/Psychosocial   Plan Of Care Reviewed With patient;spouse

## 2018-06-12 NOTE — ASSESSMENT & PLAN NOTE
- mildly elevated with initial labs   - likely r/t impaired cardiac function  - repeat labs pending for now  - consider ABD US with values worsen

## 2018-06-12 NOTE — H&P
[facility] Hospital Medicine  History & Physical    Patient Name: Chinedu Pineda  MRN: [id number]  Admission Date: 6/11/2018  Attending Physician: Trevor Koroma MD   Primary Care Provider: Diomedes Vega MD         Patient information was obtained from patient, spouse/SO and ER records.     Subjective:     Principal Problem:Upper GI bleed    Chief Complaint:   Chief Complaint   Patient presents with    Abdominal Pain    Hematemesis        HPI: 46 y/o gentleman, who presents to the ED with c/o ABD pain and N/V.  He has a PMH of HTN, CAD, prior MI, and CHF with an estimated EF reportedly of 15%.  He reports that symptoms started 5 days ago and have been persistent since onset.  He states that he was evaluated at [facility] ED and a CT was performed which was reportedly negative for acute findings.  He was discharged home with PO pantoprazole.  He states that discomfort is severe and in the epigastric regimen.  He reports minimal changes in symptoms since that time and endorses seeing bright red blood in his vomit today.  At this time, he c/o ongoing abd discomfort despite morphine administration.   He endorses worsening edema (hands, ABD, and BLE).  He states that he has not been able to take any of his home medications including his furosemide d/t the ongoing vomiting. He denies fever, chills, CP, SOB, diarrhea, or  symptoms.   He is s/p pacer / AICD implantation (2013) which was placed while in [place].     Past Medical History:   Diagnosis Date    Coronary artery disease     Hypertension     MI (myocardial infarction)        Past Surgical History:   Procedure Laterality Date    APPENDECTOMY      CHOLECYSTECTOMY      pacer defibrillator         Review of patient's allergies indicates:  No Known Allergies    No current facility-administered medications on file prior to encounter.      No current outpatient prescriptions on file prior to encounter.     Family History      Problem Relation (Age of Onset)    Cancer Sister    Diabetes Brother    Heart disease Father    Hypertension Mother, Father        Social History Main Topics    Smoking status: Former Smoker    Smokeless tobacco: Not on file    Alcohol use Yes      Comment:  once and awhile    Drug use: No    Sexual activity: Yes     Review of Systems   Constitutional: Negative for chills and fever.   HENT: Negative for congestion and trouble swallowing.    Eyes: Negative for discharge and visual disturbance.   Respiratory: Negative for cough, chest tightness and shortness of breath.    Cardiovascular: Positive for leg swelling. Negative for chest pain and palpitations.   Gastrointestinal: Positive for abdominal distention, abdominal pain, nausea and vomiting. Negative for diarrhea.   Genitourinary: Negative for dysuria and hematuria.   Musculoskeletal: Negative for back pain and joint swelling.   Skin: Negative.  Negative for color change and rash.   Neurological: Negative for seizures and syncope.     Objective:     Vital Signs (Most Recent):  Temp: 99.9 °F (37.7 °C) (06/11/18 2000)  Pulse: 103 (06/11/18 2000)  Resp: 18 (06/11/18 2000)  BP: 120/79 (06/11/18 2000)  SpO2: 99 % (06/11/18 2000) Vital Signs (24h Range):  Temp:  [98.2 °F (36.8 °C)-99.9 °F (37.7 °C)] 99.9 °F (37.7 °C)  Pulse:  [] 103  Resp:  [18-20] 18  SpO2:  [94 %-100 %] 99 %  BP: (120-125)/(79-88) 120/79     Weight: 89.4 kg (197 lb)  Body mass index is 25.99 kg/m².    Physical Exam   Constitutional: He is oriented to person, place, and time. He appears well-developed and well-nourished. He appears ill. No distress.   Appears uncomfortable    HENT:   Head: Normocephalic and atraumatic.   Eyes: EOM are normal. Pupils are equal, round, and reactive to light.   Neck: Normal range of motion. Neck supple.   Cardiovascular: Normal rate, regular rhythm, normal heart sounds and intact distal pulses.    No murmur heard.  pacermaker / AICD L upper chest    Pulmonary/Chest: Effort normal. No respiratory distress. He has no wheezes. He has rales.   Abdominal: Soft. Bowel sounds are normal. He exhibits distension. There is tenderness.   Musculoskeletal: Normal range of motion. He exhibits edema.   Neurological: He is alert and oriented to person, place, and time.   Skin: Skin is warm and dry. He is not diaphoretic.   Psychiatric: He has a normal mood and affect. His behavior is normal.   Nursing note and vitals reviewed.        CRANIAL NERVES     CN III, IV, VI   Pupils are equal, round, and reactive to light.  Extraocular motions are normal.        Significant Labs:   CBC:   Recent Labs  Lab 06/11/18  1540   WBC 12.30   HGB 14.3   HCT 43.7        CMP:   Recent Labs  Lab 06/11/18  1540   *   K 3.7   CL 98   CO2 25      BUN 19   CREATININE 1.3   CALCIUM 9.2   PROT 6.9   ALBUMIN 3.4*   BILITOT 1.6*   ALKPHOS 69   AST 42*   ALT 83*   ANIONGAP 12   EGFRNONAA >60       Significant Imaging: I have reviewed all pertinent imaging results/findings within the past 24 hours.    Assessment/Plan:     * Upper GI bleed    Nausea & vomiting  - presents with 5 days of ABD pain with associative N/V  - endorses BRB noted with emesis today   - GI consulted   - clears for now and NPO after MN  - gentle hydration while NPO   - continue pantoprazole infusion   - PRN antiemetics and pain management   - GI cocktail x 1  - Q 6 h/h for now         CHF (congestive heart failure)    Cardiac pacemaker in situ  - S/P AICD / pacer placement 2013  - reports EF estimated at 15%  - hold furosemide for now given NPO status and poor PO intake x 5 days   - monitor fluid status closely - Strict I/o's - Daily Wt's  - tele  - repeat ECHO pending   - BNP requested         CAD (coronary artery disease)    - hx of prior MI   - denies CP / SOB at this time  - EKG without obvious acute ischemic changes   - maintain on telemetry  - trend electrolytes daily and replete as indicated          Essential hypertension    - SBP ranging between 120-125  - continue sacubitril / valsartan   - hold furosemide for now         Transaminitis    - mildly elevated with initial labs   - likely r/t impaired cardiac function  - repeat labs pending for now  - consider ABD US with values worsen            VTE Risk Mitigation         Ordered     Place sequential compression device  Until discontinued      06/11/18 2103     Place VIDAL hose  Until discontinued      06/11/18 2103     IP VTE LOW RISK PATIENT  Once      06/11/18 2440             Christian Baugh NP  Department of Hospital Medicine   [facility]

## 2018-06-12 NOTE — ANESTHESIA PREPROCEDURE EVALUATION
06/12/2018  Chinedu Pineda is a 47 y.o., male.    Anesthesia Evaluation    I have reviewed the Patient Summary Reports.    I have reviewed the Nursing Notes.   I have reviewed the Medications.     Review of Systems  Anesthesia Hx:  No problems with previous Anesthesia    Social:  Former Smoker    Hematology/Oncology:  Hematology Normal   Oncology Normal     EENT/Dental:EENT/Dental Normal   Cardiovascular:   Pacemaker Hypertension Past MI CAD   CHF    Pulmonary:  Pulmonary Normal    Renal/:  Renal/ Normal     Hepatic/GI:   Hematemesis    Musculoskeletal:  Musculoskeletal Normal    Neurological:  Neurology Normal    Endocrine:  Endocrine Normal    Dermatological:  Skin Normal    Psych:  Psychiatric Normal           Physical Exam  General:  Well nourished    Airway/Jaw/Neck:  Airway Findings: Mouth Opening: Normal Tongue: Normal  General Airway Assessment: Adult  Mallampati: I        Eyes/Ears/Nose:  EYES/EARS/NOSE FINDINGS: Normal   Dental:  Dental Findings: Upper Dentures, Lower Dentures   Chest/Lungs:  Chest/Lungs Findings: Clear to auscultation, Normal Respiratory Rate     Heart/Vascular:  Heart Findings: Rate: Normal  Rhythm: Regular Rhythm        Mental Status:  Mental Status Findings:  Cooperative, Alert and Oriented         Anesthesia Plan  Type of Anesthesia, risks & benefits discussed:  Anesthesia Type:  general  Patient's Preference:   Intra-op Monitoring Plan: standard ASA monitors  Intra-op Monitoring Plan Comments:   Post Op Pain Control Plan: IV/PO Opioids PRN  Post Op Pain Control Plan Comments:   Induction:   IV  Beta Blocker:  Patient is not currently on a Beta-Blocker (No further documentation required).       Informed Consent: Patient understands risks and agrees with Anesthesia plan.  Questions answered. Anesthesia consent signed with patient.  ASA Score: 3     Day of Surgery Review of  History & Physical: I have interviewed and examined the patient. I have reviewed the patient's H&P dated:  There are no significant changes.  H&P update referred to the provider.         Ready For Surgery From Anesthesia Perspective.

## 2018-06-12 NOTE — [employer] PATIENT INSTRUCTIONS
Discharge Summary/Instructions after an Endoscopic Procedure  Patient Name: Chinedu Pineda  Patient MRN: [id number]  Patient YOB: 1970 Tuesday, June 12, 2018  Quan Lu MD  RESTRICTIONS:  During your procedure today, you received medications for sedation.  These   medications may affect your judgment, balance and coordination.  Therefore,   for 24 hours, you have the following restrictions:   - DO NOT drive a car, operate machinery, make legal/financial decisions,   sign important papers or drink alcohol.    ACTIVITY:  Today: no heavy lifting, straining or running due to procedural   sedation/anesthesia.  The following day: return to full activity including work.  DIET:  Eat and drink normally unless instructed otherwise.     TREATMENT FOR COMMON SIDE EFFECTS:  - Mild abdominal pain, nausea, belching, bloating or excessive gas:  rest,   eat lightly and use a heating pad.  - Sore Throat: treat with throat lozenges and/or gargle with warm salt   water.  - Because air was used during the procedure, expelling large amounts of air   from your rectum or belching is normal.  - If a bowel prep was taken, you may not have a bowel movement for 1-3 days.    This is normal.  SYMPTOMS TO WATCH FOR AND REPORT TO YOUR PHYSICIAN:  1. Abdominal pain or bloating, other than gas cramps.  2. Chest pain.  3. Back pain.  4. Signs of infection such as: chills or fever occurring within 24 hours   after the procedure.  5. Rectal bleeding, which would show as bright red, maroon, or black stools.   (A tablespoon of blood from the rectum is not serious, especially if   hemorrhoids are present.)  6. Vomiting.  7. Weakness or dizziness.  GO DIRECTLY TO THE NEAREST EMERGENCY ROOM IF YOU HAVE ANY OF THE FOLLOWING:      Difficulty breathing              Chills and/or fever over 101 F   Persistent vomiting and/or vomiting blood   Severe abdominal pain   Severe chest pain   Black, tarry stools   Bleeding- more than one  tablespoon   Any other symptom or condition that you feel may need urgent attention  Your doctor recommends these additional instructions:  If any biopsies were taken, your doctors clinic will contact you in 1 to 2   weeks with any results.  - Return patient to hospital pace for ongoing care.   - Advance diet as tolerated and clear liquid diet.   - Continue present medications.   - No aspirin, ibuprofen, naproxen, or other non-steroidal anti-inflammatory   drugs.   - Await pathology results.   - Repeat upper endoscopy in 3 months to check healing.   - Follow an antireflux regimen.   - Use Protonix (pantoprazole) 40 mg PO BID.   - Use sucralfate suspension 1 gram PO QID for 10 days.  For questions, problems or results please call your physician - Quan Lu MD at Work:  [phone].  [facility], EMERGENCY ROOM PHONE NUMBER: [phone]  IF A COMPLICATION OR EMERGENCY SITUATION ARISES AND YOU ARE UNABLE TO REACH   YOUR PHYSICIAN - GO DIRECTLY TO THE EMERGENCY ROOM.  Quan Lu MD  6/12/2018 7:59:40 AM  This report has been verified and signed electronically.  [employer]

## 2018-06-12 NOTE — PLAN OF CARE
Problem: Patient Care Overview  Goal: Plan of Care Review  Outcome: Ongoing (interventions implemented as appropriate)   06/12/18 5904   Coping/Psychosocial   Plan Of Care Reviewed With patient;spouse;family   Pt medicated for pain and nausea. NAD noted. POC reviewed w pt and they verbalized understanding. Tele-  SR   Pt free from falls, Pt ambulates to the bathroom. Bed in low position, side rails up x 2. Call light in reach,and wheels locked.

## 2018-06-12 NOTE — PLAN OF CARE
PCP is Dr Vega in [place].  Verified insurance as [place] medicaid.  Pharmacy is [facility] in [place].  Denies HH/DME.  Discharge plan is home; no needs.       06/12/18 1409   Discharge Assessment   Assessment Type Discharge Planning Assessment   Confirmed/corrected address and phone number on facesheet? Yes   Assessment information obtained from? Patient   Prior to hospitilization cognitive status: Alert/Oriented   Prior to hospitalization functional status: Independent   Current cognitive status: Alert/Oriented   Current Functional Status: Independent   Lives With spouse;child(ann), dependent   Able to Return to Prior Arrangements no   Is patient able to care for self after discharge? No   Patient's perception of discharge disposition home or selfcare   Readmission Within The Last 30 Days no previous admission in last 30 days   Patient currently being followed by outpatient case management? No   Patient currently receives any other outside agency services? No   Equipment Currently Used at Home none   Do you have any problems affording any of your prescribed medications? No   Is the patient taking medications as prescribed? yes   Does the patient have transportation home? Yes   Transportation Available family or friend will provide   Does the patient receive services at the Coumadin Clinic? No   Discharge Plan A Home   Patient/Family In Agreement With Plan yes

## 2018-06-12 NOTE — SUBJECTIVE & OBJECTIVE
Past Medical History:   Diagnosis Date    Coronary artery disease     Hypertension     MI (myocardial infarction)        Past Surgical History:   Procedure Laterality Date    APPENDECTOMY      CHOLECYSTECTOMY      pacer defibrillator         Review of patient's allergies indicates:  No Known Allergies    No current facility-administered medications on file prior to encounter.      No current outpatient prescriptions on file prior to encounter.     Family History     Problem Relation (Age of Onset)    Cancer Sister    Diabetes Brother    Heart disease Father    Hypertension Mother, Father        Social History Main Topics    Smoking status: Former Smoker    Smokeless tobacco: Not on file    Alcohol use Yes      Comment:  once and awhile    Drug use: No    Sexual activity: Yes     Review of Systems   Constitutional: Negative for chills and fever.   HENT: Negative for congestion and trouble swallowing.    Eyes: Negative for discharge and visual disturbance.   Respiratory: Negative for cough, chest tightness and shortness of breath.    Cardiovascular: Positive for leg swelling. Negative for chest pain and palpitations.   Gastrointestinal: Positive for abdominal distention, abdominal pain, nausea and vomiting. Negative for diarrhea.   Genitourinary: Negative for dysuria and hematuria.   Musculoskeletal: Negative for back pain and joint swelling.   Skin: Negative.  Negative for color change and rash.   Neurological: Negative for seizures and syncope.     Objective:     Vital Signs (Most Recent):  Temp: 99.9 °F (37.7 °C) (06/11/18 2000)  Pulse: 103 (06/11/18 2000)  Resp: 18 (06/11/18 2000)  BP: 120/79 (06/11/18 2000)  SpO2: 99 % (06/11/18 2000) Vital Signs (24h Range):  Temp:  [98.2 °F (36.8 °C)-99.9 °F (37.7 °C)] 99.9 °F (37.7 °C)  Pulse:  [] 103  Resp:  [18-20] 18  SpO2:  [94 %-100 %] 99 %  BP: (120-125)/(79-88) 120/79     Weight: 89.4 kg (197 lb)  Body mass index is 25.99 kg/m².    Physical Exam    Constitutional: He is oriented to person, place, and time. He appears well-developed and well-nourished. He appears ill. No distress.   Appears uncomfortable    HENT:   Head: Normocephalic and atraumatic.   Eyes: EOM are normal. Pupils are equal, round, and reactive to light.   Neck: Normal range of motion. Neck supple.   Cardiovascular: Normal rate, regular rhythm, normal heart sounds and intact distal pulses.    No murmur heard.  pacermaker / AICD L upper chest   Pulmonary/Chest: Effort normal. No respiratory distress. He has no wheezes. He has rales.   Abdominal: Soft. Bowel sounds are normal. He exhibits distension. There is tenderness.   Musculoskeletal: Normal range of motion. He exhibits edema.   Neurological: He is alert and oriented to person, place, and time.   Skin: Skin is warm and dry. He is not diaphoretic.   Psychiatric: He has a normal mood and affect. His behavior is normal.   Nursing note and vitals reviewed.        CRANIAL NERVES     CN III, IV, VI   Pupils are equal, round, and reactive to light.  Extraocular motions are normal.        Significant Labs:   CBC:   Recent Labs  Lab 06/11/18  1540   WBC 12.30   HGB 14.3   HCT 43.7        CMP:   Recent Labs  Lab 06/11/18  1540   *   K 3.7   CL 98   CO2 25      BUN 19   CREATININE 1.3   CALCIUM 9.2   PROT 6.9   ALBUMIN 3.4*   BILITOT 1.6*   ALKPHOS 69   AST 42*   ALT 83*   ANIONGAP 12   EGFRNONAA >60       Significant Imaging: I have reviewed all pertinent imaging results/findings within the past 24 hours.

## 2018-06-12 NOTE — ASSESSMENT & PLAN NOTE
Cardiac pacemaker in situ  - S/P AICD / pacer placement 2013  - reports EF estimated at 15%  - hold furosemide for now given NPO status and poor PO intake x 5 days   - monitor fluid status closely - Strict I/o's - Daily Wt's  - tele  - repeat ECHO pending   - BNP requested

## 2018-06-13 VITALS
BODY MASS INDEX: 26.11 KG/M2 | DIASTOLIC BLOOD PRESSURE: 63 MMHG | TEMPERATURE: 98 F | SYSTOLIC BLOOD PRESSURE: 110 MMHG | OXYGEN SATURATION: 98 % | HEART RATE: 84 BPM | WEIGHT: 197 LBS | RESPIRATION RATE: 18 BRPM | HEIGHT: 73 IN

## 2018-06-13 LAB
ALBUMIN SERPL BCP-MCNC: 2.7 G/DL
ALP SERPL-CCNC: 53 U/L
ALT SERPL W/O P-5'-P-CCNC: 43 U/L
ANION GAP SERPL CALC-SCNC: 7 MMOL/L
AST SERPL-CCNC: 17 U/L
BASOPHILS # BLD AUTO: 0 K/UL
BASOPHILS NFR BLD: 0.2 %
BILIRUB SERPL-MCNC: 1 MG/DL
BUN SERPL-MCNC: 16 MG/DL
CALCIUM SERPL-MCNC: 8.3 MG/DL
CHLORIDE SERPL-SCNC: 97 MMOL/L
CO2 SERPL-SCNC: 27 MMOL/L
CREAT SERPL-MCNC: 1.1 MG/DL
DIFFERENTIAL METHOD: ABNORMAL
EOSINOPHIL # BLD AUTO: 0.3 K/UL
EOSINOPHIL NFR BLD: 3.2 %
ERYTHROCYTE [DISTWIDTH] IN BLOOD BY AUTOMATED COUNT: 16.8 %
EST. GFR  (AFRICAN AMERICAN): >60 ML/MIN/1.73 M^2
EST. GFR  (NON AFRICAN AMERICAN): >60 ML/MIN/1.73 M^2
GLUCOSE SERPL-MCNC: 117 MG/DL
HCT VFR BLD AUTO: 38.2 %
HGB BLD-MCNC: 12.6 G/DL
LYMPHOCYTES # BLD AUTO: 2.2 K/UL
LYMPHOCYTES NFR BLD: 20.6 %
MAGNESIUM SERPL-MCNC: 2.2 MG/DL
MCH RBC QN AUTO: 31.5 PG
MCHC RBC AUTO-ENTMCNC: 33 G/DL
MCV RBC AUTO: 95 FL
MONOCYTES # BLD AUTO: 1 K/UL
MONOCYTES NFR BLD: 9.2 %
NEUTROPHILS # BLD AUTO: 7.1 K/UL
NEUTROPHILS NFR BLD: 66.8 %
PHOSPHATE SERPL-MCNC: 2.6 MG/DL
PLATELET # BLD AUTO: 160 K/UL
PMV BLD AUTO: 10.2 FL
POTASSIUM SERPL-SCNC: 4.2 MMOL/L
PROT SERPL-MCNC: 5.7 G/DL
RBC # BLD AUTO: 4.01 M/UL
SODIUM SERPL-SCNC: 131 MMOL/L
WBC # BLD AUTO: 10.7 K/UL

## 2018-06-13 PROCEDURE — 25000003 PHARM REV CODE 250: Performed by: INTERNAL MEDICINE

## 2018-06-13 PROCEDURE — 85025 COMPLETE CBC W/AUTO DIFF WBC: CPT

## 2018-06-13 PROCEDURE — 84100 ASSAY OF PHOSPHORUS: CPT

## 2018-06-13 PROCEDURE — 96365 THER/PROPH/DIAG IV INF INIT: CPT

## 2018-06-13 PROCEDURE — 63600175 PHARM REV CODE 636 W HCPCS: Performed by: INTERNAL MEDICINE

## 2018-06-13 PROCEDURE — 96376 TX/PRO/DX INJ SAME DRUG ADON: CPT

## 2018-06-13 PROCEDURE — 25000003 PHARM REV CODE 250: Performed by: NURSE PRACTITIONER

## 2018-06-13 PROCEDURE — 96375 TX/PRO/DX INJ NEW DRUG ADDON: CPT

## 2018-06-13 PROCEDURE — 80053 COMPREHEN METABOLIC PANEL: CPT

## 2018-06-13 PROCEDURE — 83735 ASSAY OF MAGNESIUM: CPT

## 2018-06-13 PROCEDURE — 36415 COLL VENOUS BLD VENIPUNCTURE: CPT

## 2018-06-13 PROCEDURE — C9113 INJ PANTOPRAZOLE SODIUM, VIA: HCPCS | Performed by: INTERNAL MEDICINE

## 2018-06-13 PROCEDURE — G0378 HOSPITAL OBSERVATION PER HR: HCPCS

## 2018-06-13 PROCEDURE — 96374 THER/PROPH/DIAG INJ IV PUSH: CPT

## 2018-06-13 PROCEDURE — 99224 PR SUBSEQUENT OBSERVATION CARE,LEVEL I: CPT | Mod: ,,, | Performed by: INTERNAL MEDICINE

## 2018-06-13 PROCEDURE — 63600175 PHARM REV CODE 636 W HCPCS: Performed by: NURSE PRACTITIONER

## 2018-06-13 PROCEDURE — 96366 THER/PROPH/DIAG IV INF ADDON: CPT

## 2018-06-13 PROCEDURE — 96361 HYDRATE IV INFUSION ADD-ON: CPT

## 2018-06-13 PROCEDURE — 96367 TX/PROPH/DG ADDL SEQ IV INF: CPT

## 2018-06-13 RX ORDER — METOPROLOL TARTRATE 25 MG/1
12.5 TABLET, FILM COATED ORAL 2 TIMES DAILY
Qty: 60 TABLET | Refills: 0 | Status: SHIPPED | OUTPATIENT
Start: 2018-06-13

## 2018-06-13 RX ORDER — SODIUM,POTASSIUM PHOSPHATES 280-250MG
2 POWDER IN PACKET (EA) ORAL ONCE
Status: COMPLETED | OUTPATIENT
Start: 2018-06-13 | End: 2018-06-13

## 2018-06-13 RX ORDER — ROSUVASTATIN CALCIUM 20 MG/1
20 TABLET, COATED ORAL NIGHTLY
Qty: 30 TABLET | Refills: 0 | Status: SHIPPED | OUTPATIENT
Start: 2018-06-13 | End: 2019-06-13

## 2018-06-13 RX ORDER — METOPROLOL TARTRATE 25 MG/1
12.5 TABLET ORAL 2 TIMES DAILY
Status: DISCONTINUED | OUTPATIENT
Start: 2018-06-13 | End: 2018-06-13 | Stop reason: HOSPADM

## 2018-06-13 RX ORDER — ONDANSETRON 4 MG/1
4 TABLET, FILM COATED ORAL EVERY 6 HOURS PRN
Qty: 15 TABLET | Refills: 0 | Status: SHIPPED | OUTPATIENT
Start: 2018-06-13

## 2018-06-13 RX ORDER — SUCRALFATE 1 G/10ML
1 SUSPENSION ORAL EVERY 6 HOURS
Qty: 400 ML | Refills: 0 | Status: SHIPPED | OUTPATIENT
Start: 2018-06-13 | End: 2018-06-23

## 2018-06-13 RX ORDER — TRAMADOL HYDROCHLORIDE 50 MG/1
50 TABLET ORAL EVERY 6 HOURS PRN
Qty: 20 TABLET | Refills: 0 | Status: SHIPPED | OUTPATIENT
Start: 2018-06-13 | End: 2018-06-23

## 2018-06-13 RX ADMIN — SUCRALFATE 1 G: 1 SUSPENSION ORAL at 06:06

## 2018-06-13 RX ADMIN — SUCRALFATE 1 G: 1 SUSPENSION ORAL at 12:06

## 2018-06-13 RX ADMIN — SUCRALFATE 1 G: 1 SUSPENSION ORAL at 11:06

## 2018-06-13 RX ADMIN — MORPHINE SULFATE 15 MG: 10 SOLUTION ORAL at 08:06

## 2018-06-13 RX ADMIN — Medication 12.5 MG: at 11:06

## 2018-06-13 RX ADMIN — SACUBITRIL AND VALSARTAN 1 TABLET: 49; 51 TABLET, FILM COATED ORAL at 08:06

## 2018-06-13 RX ADMIN — Medication 2 MG: at 06:06

## 2018-06-13 RX ADMIN — DEXTROSE 8 MG/HR: 50 INJECTION, SOLUTION INTRAVENOUS at 10:06

## 2018-06-13 RX ADMIN — DEXTROSE 8 MG/HR: 50 INJECTION, SOLUTION INTRAVENOUS at 04:06

## 2018-06-13 RX ADMIN — PROMETHAZINE HYDROCHLORIDE 12.5 MG: 25 INJECTION INTRAMUSCULAR; INTRAVENOUS at 12:06

## 2018-06-13 RX ADMIN — MORPHINE SULFATE 15 MG: 10 SOLUTION ORAL at 04:06

## 2018-06-13 RX ADMIN — Medication 2 MG: at 01:06

## 2018-06-13 RX ADMIN — Medication 2 MG: at 10:06

## 2018-06-13 RX ADMIN — MORPHINE SULFATE 15 MG: 10 SOLUTION ORAL at 12:06

## 2018-06-13 RX ADMIN — ONDANSETRON 8 MG: 4 TABLET, ORALLY DISINTEGRATING ORAL at 10:06

## 2018-06-13 RX ADMIN — POTASSIUM & SODIUM PHOSPHATES POWDER PACK 280-160-250 MG 2 PACKET: 280-160-250 PACK at 11:06

## 2018-06-13 NOTE — PLAN OF CARE
06/13/18 0810   PRE-TX-[id number]   O2 Device (Oxygen Therapy) room air   SpO2 98 %   Pulse Oximetry Type Intermittent   $ Pulse Oximetry - Single Charge Pulse Oximetry - Single

## 2018-06-13 NOTE — PLAN OF CARE
Problem: Pain, Acute (Adult)  Goal: Acceptable Pain Control/Comfort Level  Patient will demonstrate the desired outcomes by discharge/transition of care.   Outcome: Ongoing (interventions implemented as appropriate)   06/13/18 0100   Pain, Acute (Adult)   Acceptable Pain Control/Comfort Level making progress toward outcome

## 2018-06-13 NOTE — NURSING
Discharge instructions went over with pt. Pt verbalizes understanding and has no new questions at this time. VSS. IV removed, catheter intact. Telemetry monitor removed. AVS printed and given to pt along with printed prescription. Awaiting pt transport. Will continue to monitor.

## 2018-06-14 NOTE — PLAN OF CARE
06/14/18 1539   Final Note   Assessment Type Final Discharge Note   Discharge Disposition Home

## 2018-06-15 PROBLEM — K29.80 DUODENITIS: Status: ACTIVE | Noted: 2018-06-15

## 2018-06-17 LAB
BACTERIA BLD CULT: NORMAL
BACTERIA BLD CULT: NORMAL